# Patient Record
Sex: FEMALE | Race: AMERICAN INDIAN OR ALASKA NATIVE
[De-identification: names, ages, dates, MRNs, and addresses within clinical notes are randomized per-mention and may not be internally consistent; named-entity substitution may affect disease eponyms.]

---

## 2018-04-27 ENCOUNTER — HOSPITAL ENCOUNTER (INPATIENT)
Dept: HOSPITAL 5 - LD | Age: 38
LOS: 3 days | Discharge: HOME | End: 2018-04-30
Attending: OBSTETRICS & GYNECOLOGY | Admitting: OBSTETRICS & GYNECOLOGY
Payer: COMMERCIAL

## 2018-04-27 DIAGNOSIS — Z3A.40: ICD-10-CM

## 2018-04-27 DIAGNOSIS — D64.9: ICD-10-CM

## 2018-04-27 DIAGNOSIS — O41.03X0: ICD-10-CM

## 2018-04-27 LAB
HCT VFR BLD CALC: 32.1 % (ref 30.3–42.9)
HGB BLD-MCNC: 11.1 GM/DL (ref 10.1–14.3)
MCH RBC QN AUTO: 31 PG (ref 28–32)
MCHC RBC AUTO-ENTMCNC: 35 % (ref 30–34)
MCV RBC AUTO: 90 FL (ref 79–97)
PLATELET # BLD: 159 K/MM3 (ref 140–440)
RBC # BLD AUTO: 3.59 M/MM3 (ref 3.65–5.03)

## 2018-04-27 PROCEDURE — 36415 COLL VENOUS BLD VENIPUNCTURE: CPT

## 2018-04-27 PROCEDURE — 86592 SYPHILIS TEST NON-TREP QUAL: CPT

## 2018-04-27 PROCEDURE — 86850 RBC ANTIBODY SCREEN: CPT

## 2018-04-27 PROCEDURE — 86900 BLOOD TYPING SEROLOGIC ABO: CPT

## 2018-04-27 PROCEDURE — 85027 COMPLETE CBC AUTOMATED: CPT

## 2018-04-27 PROCEDURE — 99211 OFF/OP EST MAY X REQ PHY/QHP: CPT

## 2018-04-27 PROCEDURE — 85014 HEMATOCRIT: CPT

## 2018-04-27 PROCEDURE — G0463 HOSPITAL OUTPT CLINIC VISIT: HCPCS

## 2018-04-27 PROCEDURE — 85018 HEMOGLOBIN: CPT

## 2018-04-27 PROCEDURE — 88307 TISSUE EXAM BY PATHOLOGIST: CPT

## 2018-04-27 PROCEDURE — A6250 SKIN SEAL PROTECT MOISTURIZR: HCPCS

## 2018-04-27 PROCEDURE — 86901 BLOOD TYPING SEROLOGIC RH(D): CPT

## 2018-04-27 RX ADMIN — FENTANYL CITRATE PRN MCG: 50 INJECTION, SOLUTION INTRAMUSCULAR; INTRAVENOUS at 17:03

## 2018-04-27 RX ADMIN — FENTANYL CITRATE PRN MCG: 50 INJECTION, SOLUTION INTRAMUSCULAR; INTRAVENOUS at 15:00

## 2018-04-27 RX ADMIN — KETOROLAC TROMETHAMINE PRN MG: 30 INJECTION, SOLUTION INTRAMUSCULAR at 19:44

## 2018-04-27 NOTE — HISTORY AND PHYSICAL REPORT
History of Present Illness


Date of examination: 18


Date of admission: 


18 12:07





Chief complaint: 


decreased fm





History of present illness: 





EDC Calculations 


LMP: 2018











Past Pregnancy History 


   :      4


   Term Births:      2


   Premature Births:   0


   Living Children:   2


   Para:         2


   Aborta:      1


   Elect. Ab:      1





Pregnancy # 1


   Delivery date:     2000


   Weeks Gestation:   term


   Delivery type:     


   Anesthesia type:     none


   Delivery location:      [facility]


   Infant Sex:      Female


   Birth weight:      7-6


   Comments:      ptylism / HG





Pregnancy # 2


   Delivery date:     2004


   Weeks Gestation:   6


   Delivery type:     EAB


   Comments:      denies





Pregnancy # 3


   Delivery date:     2013


   Weeks Gestation:   term


   Delivery type:     


   Anesthesia type:     epidural


   Del[facility] location:     [facility]


   Infant Sex:      Female


   Birth weight:      7-6


   Comments:      ptylism / HG / Iron infusions








Risk Factors: 





Smoked Tobacco Use:  Never smoker


Smokeless Tobacco Use:  Never


Passive smoke exposure:  no


Drug use:  no


HIV high-risk behavior:  low risk


Caffeine use:  1 drinks per day


Alcohol use:  no


Seatbelt use:  preg- %





Dietary Counseling: pn yes








Past Medical History:


   Anemia Pt had iron infusion with last pregnancy





Past Surgical History:


   Appendectomy





Past Medical History 


Surgery (Non-gyn): Appendectomy





Abnormal PAP: negative


GARLAND Exposure: negative


Infertility: negative


Uterine Anomaly: negative


Uterine Surgery (not C/S): negative


Other Gynecologic Problems: negative





Infection History 


Hx of STD: none


HIV Risk Eval: low risk


Hepatitis B Risk Eval: low risk


Personal hx. of genital herpes: no


Partner hx. of genital herpes: no


Rash, Viral, or Febrile illness since last LMP? no


Varicella/Chicken Pox Status: Previous Disease


TB Risk: no





Genetic History 


ADVANCED MATERNAL AGE


Congenital Heart Defect:


    Mom: no  Dad: no


Canavan Disease:


    Mom: no  Dad: no


Thalassemia


    Mom: no  Dad: no


Neural Tube Defect


    Mom: no  Dad: no


Down's Syndrome


    Mom: no  Dad: no


Hector-Sachs


    Mom: no  Dad: no


Sickle Cell Disease/Trait


    Mom: no  Dad: no


Hemophilia


    Mom: no  Dad: no


Muscular Dystrophy


    Mom: no  Dad: no


Cystic Fibrosis


    Mom: no  Dad: no


[place] Chorea


    Mom: no  Dad: no


Mental Retardation


    Mom: no  Dad: no


Fragile X


    Mom: no  Dad: no


Other Genetic/Chromosomal Disorder


    Mom: no  Dad: no


Child w/other birth defect


    Mom: no  Dad: no





Enviromental Exposures 


Xray Exposure: no


Medication, drug, or alcohol use since LMP: no


Chemical/Other Exposure: no


Exposure to Cat Liter: no


Hx of Parvovirus (Fifth Disease): no


Occupational Exposure to Children: none


Active Medications:


PROMETHAZINE HCL 25 MG SUPP (PROMETHAZINE HCL) 1 per rectum q6hrs prn


ZOFRAN ODT 8 MG TBDP (ONDANSETRON) 1 po q12hrs prn





Current Allergies:


No known allergies





Past History


Past Medical History: other (see HPI)


Past Surgical History: other (See HPI)





- Obstetrical History


Expected Date of Delivery: 18


Actual Gestation: 40 Week(s) 4 Day(s) 


: 4


Para: 2


Hx # Term Pregnancies: 2


Number of  Pregnancies: 0


Spontaneous Abortions: 1


Induced : 0


Number of Living Children: 2





Review of Systems


All systems: negative





- Physical Exam


Breasts: Positive: normal


Cardiovascular: Regular rate


Lungs: Positive: Clear to auscultation, Normal air movement


Abdomen: Positive: normal appearance, soft


Genitourinary (Female): Positive: normal external genitalia, normal perenium


Vulva: both: normal


Vagina: Positive: normal moisture


Uterus: Positive: normal size, normal contour


Extremities: Positive: normal


Deep Tendon Reflex Grade: Normal +2





- Obstetrical


Cervical Dilatation: 4.5


Cervical Effacement Percentage: 60


Fetal station: -2


Uterine Tone Measurement Phase: Resting





Results


All other labs normal.








Assessment and Plan


 38y/o  admitted after arriving for routine office visit and reporting 

decreased FM. BPP in office 0/8, Neelima 3, vertex., efw 8#1oz. Patient admitted 

for delivery. GBS NEG, pregnancy uncomplicated except for anemia  and advanced 

maternal age. Admission orders in EMR. Dr. Walton aware of patient's status.








- Patient Problems


(1) Non-reassuring fetal status


Current Visit: Yes   Status: Acute   





(2) 40 weeks gestation of pregnancy


Current Visit: Yes   Status: Acute   





(3) Abnormal  test


Current Visit: Yes   Status: Acute   





(4) Anemia


Current Visit: Yes   Status: Acute

## 2018-04-27 NOTE — PROGRESS NOTE
Assessment and Plan


 AROM, clear fluid. ISE and IUPC placed without difficulty.  FHT CAT 1. Will 

start pitocin induction, anticipate . Dr. Walton aware. 








- Patient Problems


(1) Non-reassuring fetal status


Current Visit: Yes   Status: Acute   





(2) 40 weeks gestation of pregnancy


Current Visit: Yes   Status: Acute   





(3) Abnormal  test


Current Visit: Yes   Status: Acute   





(4) Anemia


Current Visit: Yes   Status: Acute   





Subjective





- Subjective


Date of service: 18


Principal diagnosis: IUP @ 40+4, BPP 0/8, oligohydramnios, 


Interval history: 





EDC Calculations 


LMP: 2018











Past Pregnancy History 


   :      4


   Term Births:      2


   Premature Births:   0


   Living Children:   2


   Para:         2


   Aborta:      1


   Elect. Ab:      1





Pregnancy # 1


   Delivery date:     


   Weeks Gestation:   term


   Delivery type:     


   Anesthesia type:     none


   Delivery location:     [facility]


   Infant Sex:      Female


   Birth weight:      7-6


   Comments:      ptylism / HG





Pregnancy # 2


   Delivery date:     


   Weeks Gestation:   6


   Delivery type:     EAB


   Comments:      denies





Pregnancy # 3


   Delivery date:     


   Weeks Gestation:   term


   Delivery type:     


   Anesthesia type:     epidural


   Del[facility] location:     [facility]


   Infant Sex:      Female


   Birth weight:      7-6


   Comments:      ptylism / HG / Iron infusions








Risk Factors: 





Smoked Tobacco Use:  Never smoker


Smokeless Tobacco Use:  Never


Passive smoke exposure:  no


Drug use:  no


HIV high-risk behavior:  low risk


Caffeine use:  1 drinks per day


Alcohol use:  no


Seatbelt use:  preg- %





Dietary Counseling: pn yes








Past Medical History:


   Anemia Pt had iron infusion with last pregnancy





Past Surgical History:


   Appendectomy





Past Medical History 


Surgery (Non-gyn): Appendectomy





Abnormal PAP: negative


GARLAND Exposure: negative


Infertility: negative


Uterine Anomaly: negative


Uterine Surgery (not C/S): negative


Other Gynecologic Problems: negative





Infection History 


Hx of STD: none


HIV Risk Eval: low risk


Hepatitis B Risk Eval: low risk


Personal hx. of genital herpes: no


Partner hx. of genital herpes: no


Rash, Viral, or Febrile illness since last LMP? no


Varicella/Chicken Pox Status: Previous Disease


TB Risk: no





Genetic History 


ADVANCED MATERNAL AGE


Congenital Heart Defect:


    Mom: no  Dad: no


Canavan Disease:


    Mom: no  Dad: no


Thalassemia


    Mom: no  Dad: no


Neural Tube Defect


    Mom: no  Dad: no


Down's Syndrome


    Mom: no  Dad: no


Hector-Sachs


    Mom: no  Dad: no


Sickle Cell Disease/Trait


    Mom: no  Dad: no


Hemophilia


    Mom: no  Dad: no


Muscular Dystrophy


    Mom: no  Dad: no


Cystic Fibrosis


    Mom: no  Dad: no


Romario Chorea


    Mom: no  Dad: no


Mental Retardation


    Mom: no  Dad: no


Fragile X


    Mom: no  Dad: no


Other Genetic/Chromosomal Disorder


    Mom: no  Dad: no


Child w/other birth defect


    Mom: no  Dad: no





Enviromental Exposures 


Xray Exposure: no


Medication, drug, or alcohol use since LMP: no


Chemical/Other Exposure: no


Exposure to Cat Liter: no


Hx of Parvovirus (Fifth Disease): no


Occupational Exposure to Children: none


Active Medications:


PROMETHAZINE HCL 25 MG SUPP (PROMETHAZINE HCL) 1 per rectum q6hrs prn


ZOFRAN ODT 8 MG TBDP (ONDANSETRON) 1 po q12hrs prn





Current Allergies:


No known allergies


Patient reports: no new complaints, no loss of fluid, no vaginal bleeding, no 

fetal movement normal (decreased)





Objective





- Exam


Breasts: normal


Cardiovascular: Regular rate


Lungs: Clear to auscultation, Normal air movement


Abdomen: Present: normal appearance, soft


Vulva: both: normal


Uterus: Present: normal


FHR: auscultation normal, category 1


Uterine Contraction Monitor Mode: External


Uterine Contraction Pattern: Irregular


Uterine Tone Measurement Phase: Contraction


Uterine Contraction Intensity: Mild


Extremities: normal


Deep Tendon Reflex Grade: Normal +2

## 2018-04-27 NOTE — OPERATIVE REPORT
Operative Report


Operative Report: 


Date of procedure: 2018





Pre-operative diagnosis: 40+ weeks gestation


                                 Nonreassuring  testing 


                                 Fetal intolerance to labor


                                 Failure to descend


                                 Fetal OP presentation





Post-operative diagnosis: Same





Procedure name(s): Stat Primary low transverse  section via 

Pfannenstiel skin incision





Surgeon: Dr. Walton 





Assistant: SMOOTH





Anesthesia: Gen. endotracheal anesthesia





EBL: 300 mL





Urine output: 300 mL mL of clear urine out at the end of the procedure





Fluids: 1 L





Findings: Liveborn male infant weight 7 lbs. 9 oz. Apgars of 8 and 9 at one and 

5 minutes.  


             Grossly normal fallopian tubes and ovaries bilaterally.  


              Normal uterus  





Indications: Patient seen in the office with BPP of 2 out of 8 and decreased 

fetal movement patient was examined and noted to be in labor.  Patient sent for 

augmentation of labor.  Patient progressed to 10 cm and was noted to have 

repetitive fetal decelerations.  With pushing there was no descent of fetal 

head.  Decision was made to proceed with stat  section at this time due 

to fetal decelerations and no progression of descent.





Procedure: Patient was taking to the operating room.  Patient was then prepped 

and draped in sterile fashion after anesthesia was found to be adequate.  A low 

transverse skin incision was made with the scalpel and carried down to the 

underlying layer of fascia with the scalpel. The fascia was then incised in the 

midline and this incision was extended bilaterally with the scalpel.    The 

rectus muscles were then bluntly divided in the midline.  The peritoneum was 

identified and entered into bluntly.  A lower transverse uterine incision was 

made with the scalpel and extended bilaterally with the dissection.   The infant

's head was then delivered atraumatically in OP presentation.  The anterior 

shoulder and rest of infant delivered without difficulty.  The umbilical cord 

was clamped x2.  The cord was cut.  The infant was then placed in sterile 

bassinet.  The placenta was manually extracted in its entirety.  The uterus was 

exteriorized and cleared of all clots and debris.  The uterine incision was 

closed using 0 Vicryl in a running locking fashion.  A second imbricating layer 

of the same suture was then created.  The posterior cul-de-sac was copiously 

irrigated.  The uterus was returned to the abdomen.  The gutters were also 

irrigated.  The anterior rectus muscles were reapproximated using 3-0 Vicryl.  

The anterior rectus fascia was reapproximated using 0 Vicryl in a running 

fashion.  The subcuticular fat was reapproximated using 2-0 Vicryl in a running 

fashion.  The skin was reapproximated with 4-0 Monocryl in a subcuticular 

stitch.  The patient tolerated the procedure well.  Sponge lap and needle 

counts were all correct x3.  Patient was taken to the recovery room awake and 

in stable condition.

## 2018-04-27 NOTE — PROGRESS NOTE
Assessment and Plan


 Patient c/o urge to push with ctx, SVE anterior lip/0 station. Attempted to 

reduce the lip so patient could push. Baby's presentation direct OP.   After a 

few attempts at pushing, fht did not return to baseline. Position changes to 

knee chest, then to right and left lateral. Dr. Walton called to come to 

bedside. Patient attempted pushing with Dr. Walton without successful decent. 

Decision made for c/s. Orders in EMR and consents obtained. 








- Patient Problems


(1) Non-reassuring fetal status


Current Visit: Yes   Status: Acute   





(2) 40 weeks gestation of pregnancy


Current Visit: Yes   Status: Acute   





(3) Failure of fetal descent in labor, delivered, current hospitalization


Current Visit: Yes   Status: Acute   





Subjective





- Subjective


Date of service: 18


Principal diagnosis: IUP @ 40+4, BPP 0/8, oligohydramnios, 


Interval history: 





EDC Calculations 


LMP: 2018











Past Pregnancy History 


   :      4


   Term Births:      2


   Premature Births:   0


   Living Children:   2


   Para:         2


   Aborta:      1


   Elect. Ab:      1





Pregnancy # 1


   Delivery date:     


   Weeks Gestation:   term


   Delivery type:     


   Anesthesia type:     none


   Delivery location:     [facility]


   Infant Sex:      Female


   Birth weight:      7-6


   Comments:      ptylism / HG





Pregnancy # 2


   Delivery date:     


   Weeks Gestation:   6


   Delivery type:     EAB


   Comments:      denies





Pregnancy # 3


   Delivery date:     2013


   Weeks Gestation:   term


   Delivery type:     


   Anesthesia type:     epidural


   Del[facility] location:     [facility]


   Infant Sex:      Female


   Birth weight:      7-6


   Comments:      ptylism / HG / Iron infusions








Risk Factors: 





Smoked Tobacco Use:  Never smoker


Smokeless Tobacco Use:  Never


Passive smoke exposure:  no


Drug use:  no


HIV high-risk behavior:  low risk


Caffeine use:  1 drinks per day


Alcohol use:  no


Seatbelt use:  preg- %





Dietary Counseling: pn yes








Past Medical History:


   Anemia Pt had iron infusion with last pregnancy





Past Surgical History:


   Appendectomy





Past Medical History 


Surgery (Non-gyn): Appendectomy





Abnormal PAP: negative


GARLAND Exposure: negative


Infertility: negative


Uterine Anomaly: negative


Uterine Surgery (not C/S): negative


Other Gynecologic Problems: negative





Infection History 


Hx of STD: none


HIV Risk Eval: low risk


Hepatitis B Risk Eval: low risk


Personal hx. of genital herpes: no


Partner hx. of genital herpes: no


Rash, Viral, or Febrile illness since last LMP? no


Varicella/Chicken Pox Status: Previous Disease


TB Risk: no





Genetic History 


ADVANCED MATERNAL AGE


Congenital Heart Defect:


    Mom: no  Dad: no


Canavan Disease:


    Mom: no  Dad: no


Thalassemia


    Mom: no  Dad: no


Neural Tube Defect


    Mom: no  Dad: no


Down's Syndrome


    Mom: no  Dad: no


Hector-Sachs


    Mom: no  Dad: no


Sickle Cell Disease/Trait


    Mom: no  Dad: no


Hemophilia


    Mom: no  Dad: no


Muscular Dystrophy


    Mom: no  Dad: no


Cystic Fibrosis


    Mom: no  Dad: no


[place]una Chorea


    Mom: no  Dad: no


Mental Retardation


    Mom: no  Dad: no


Fragile X


    Mom: no  Dad: no


Other Genetic/Chromosomal Disorder


    Mom: no  Dad: no


Child w/other birth defect


    Mom: no  Dad: no





Enviromental Exposures 


Xray Exposure: no


Medication, drug, or alcohol use since LMP: no


Chemical/Other Exposure: no


Exposure to Cat Liter: no


Hx of Parvovirus (Fifth Disease): no


Occupational Exposure to Children: none


Active Medications:


PROMETHAZINE HCL 25 MG SUPP (PROMETHAZINE HCL) 1 per rectum q6hrs prn


ZOFRAN ODT 8 MG TBDP (ONDANSETRON) 1 po q12hrs prn





Current Allergies:


No known allergies


Patient reports: new complaints, loss of fluid, vaginal bleeding, contractions





Objective





- Vital Signs


Vital Signs: 


 Vital Signs - 12hr











  18





  13:00


 


Temperature 97.1 F L


 


Respiratory 18





Rate 














- Exam


Cardiovascular: Regular rate


Lungs: Clear to auscultation


Abdomen: Present: normal appearance, soft


Vulva: both: normal


Uterus: Present: normal


FHR: category 2


Uterine Contraction Monitor Mode: Internal


Cervical Dilatation: 9.5


Cervical Effacement Percentage: 100


Fetal station: 0


Uterine Contraction Frequency (min): 2-3


Uterine Contraction Duration: 60


Uterine Contraction Pattern: Regular


Uterine Tone Measurement Phase: Contraction


Uterine Contraction Intensity: Strong/Firm


Extremities: normal





- Labs


Labs: 


 Abnormal Labs











  18





  12:30


 


RBC  3.59 L


 


MCHC  35 H


 


RDW  12.8 L








 Laboratory Results - last 24 hr











  18





  12:30 12:30


 


WBC  6.2 


 


RBC  3.59 L 


 


Hgb  11.1 


 


Hct  32.1 


 


MCV  90 


 


MCH  31 


 


MCHC  35 H 


 


RDW  12.8 L 


 


Plt Count  159 


 


Blood Type   B POSITIVE


 


Antibody Screen   Negative

## 2018-04-28 LAB
HCT VFR BLD CALC: 24.3 % (ref 30.3–42.9)
HGB BLD-MCNC: 8.6 GM/DL (ref 10.1–14.3)

## 2018-04-28 RX ADMIN — IBUPROFEN PRN MG: 800 TABLET, FILM COATED ORAL at 23:47

## 2018-04-28 RX ADMIN — CEFAZOLIN SCH MLS/MIN: 10 INJECTION, POWDER, FOR SOLUTION INTRAVENOUS at 02:35

## 2018-04-28 RX ADMIN — FERROUS SULFATE TAB 325 MG (65 MG ELEMENTAL FE) SCH MG: 325 (65 FE) TAB at 12:59

## 2018-04-28 RX ADMIN — KETOROLAC TROMETHAMINE PRN MG: 30 INJECTION, SOLUTION INTRAMUSCULAR at 10:59

## 2018-04-28 RX ADMIN — CEFAZOLIN SCH MLS/MIN: 10 INJECTION, POWDER, FOR SOLUTION INTRAVENOUS at 10:54

## 2018-04-28 RX ADMIN — DOCUSATE SODIUM SCH MG: 50 LIQUID ORAL at 22:46

## 2018-04-28 RX ADMIN — DOCUSATE SODIUM SCH: 50 LIQUID ORAL at 18:58

## 2018-04-28 RX ADMIN — HYDROCODONE BITARTRATE AND ACETAMINOPHEN PRN EACH: 5; 325 TABLET ORAL at 12:59

## 2018-04-28 RX ADMIN — FERROUS SULFATE TAB 325 MG (65 MG ELEMENTAL FE) SCH MG: 325 (65 FE) TAB at 22:46

## 2018-04-28 RX ADMIN — IBUPROFEN PRN MG: 800 TABLET, FILM COATED ORAL at 18:35

## 2018-04-28 NOTE — PROGRESS NOTE
<MUNIR BALDERRAMA - Last Filed: 18 08:32>





Assessment and Plan


Pt is in very good spirits No c/o voiced Feeding baby during my visit. VSS ff 

below umb Lochia small Incision D&I H&H pending Doing well s/p c/s P: continue 

pathway Advance diet and activity as tolerated.


Reassured pt she did a great job she was question her pushing effort. Healthy 

baby Healthy mom








Subjective





- Subjective


Date of service: 18 (pt sitting up in bed breast feeding No c/o voiced)


Principal diagnosis: 12hr s/p primary  section


Patient reports: pain well controlled, other (bonilla has just been removed will 

monitor output)


[place]: doing well, nursing well





Objective





- Vital Signs


Latest vital signs: 


 Vital Signs











  Temp Pulse Resp BP BP Pulse Ox


 


 18 01:56  98.9 F  74  16   118/64 


 


 18 21:00  98.2 F  76  18   117/61 


 


 18 20:30  98.8 F  85  13  124/71   98


 


 18 20:25   78  12  114/65   97


 


 18 20:10  98.3 F  78  16  123/73   98


 


 18 19:56   105 H  15  127/88   99


 


 18 19:44    15   


 


 18 19:41   109 H  15  116/61   99


 


 18 19:26   101 H  15  126/71   99


 


 18 19:21   102 H  15  133/76   99


 


 18 19:15   97 H  15  127/75   99


 


 18 13:00  97.1 F L   18   








 Intake and Output











 18





 22:59 06:59 14:59


 


Intake Total 1003.6 100 


 


Output Total 400  


 


Balance 603.6 100 


 


Intake:   


 


  IV 1003.6  


 


    PITOCin/NS 30 UNIT/500ML 3.6  





    30 units In 500 ml @ 4   





    mls/hr IV TITR [facility] Rx#:   





    [id number]   


 


  Intake, Free Water  100 


 


Output:   


 


  Urine 400  


 


    Uretheral (Bonilla) 300  


 


Other:   


 


  Estimated Blood Loss 300  














- Exam


Breasts: Present: normal, breastfeeding


Cardiovascular: Present: Regular rate


Lungs: Present: Clear to auscultation, Normal air movement


Abdomen: Present: normal appearance, soft, normal bowel sounds


Uterus: Present: normal, firm, fundal height below umbilicus


Extremities: Present: normal


Deep Tendon Reflex Grade: Normal +2


Incision: Present: normal, dry, intact





- Labs


Labs: 


 Abnormal lab results











  18 Range/Units





  12:30 


 


RBC  3.59 L  (3.65-5.03)  M/mm3


 


MCHC  35 H  (30-34)  %


 


RDW  12.8 L  (13.2-15.2)  %














<FRANCOISE ISAAC - Last Filed: 18 13:01>





Assessment and Plan





- Patient Problems


(1)  delivery delivered


Current Visit: Yes   Status: Acute   


Plan to address problem: 


Called to room to evaluated bleeding at right side of her incision, steristrips 

removed, slight bleeding noted, hemostasis was obtained with manual pressure 

and re-enforcing the dressing and placing an abdominal binder. Will leave 

dressing in place today and evaluate area tomorrow, o/w continue post C/S 

pathway. patient and  agree with plan of care. 








Objective





- Vital Signs


Latest vital signs: 


 Vital Signs











  Temp Pulse Resp BP BP Pulse Ox


 


 18 10:59    18   


 


 18 09:00  98.2 F  87  14  108/71   98


 


 18 08:25  98.2 F  79  18   101/61 


 


 18 08:23  98.2 F  91 H  20  101/61   97


 


 18 06:35  98.9 F  84  18   105/62 


 


 18 01:56  98.9 F  74  16   118/64 


 


 18 21:00  98.2 F  76  18   117/61 


 


 18 20:30  98.8 F  85  13  124/71   98


 


 18 20:25   78  12  114/65   97


 


 18 20:10  98.3 F  78  16  123/73   98


 


 18 19:56   105 H  15  127/88   99


 


 18 19:44    15   


 


 18 19:41   109 H  15  116/61   99


 


 18 19:26   101 H  15  126/71   99


 


 18 19:21   102 H  15  133/76   99


 


 18 19:15   97 H  15  127/75   99


 


 18 13:00  97.1 F L   18   








 Intake and Output











 18





 22:59 06:59 14:59


 


Intake Total 1003.6 100 360


 


Output Total 400  


 


Balance 603.6 100 360


 


Intake:   


 


  IV 1003.6  


 


    PITOCin/NS 30 UNIT/500ML 3.6  





    30 units In 500 ml @ 4   





    mls/hr IV TITR [facility] Rx#:   





    [id number]   


 


  Oral   360


 


  Intake, Free Water  100 


 


Output:   


 


  Urine 400  


 


    Uretheral (Bonilla) 300  


 


Other:   


 


  Total, Intake Amount   360


 


  Estimated Blood Loss 300  














- Labs


Labs: 


 Abnormal lab results











  18 Range/Units





  12:30 07:56 


 


RBC  3.59 L   (3.65-5.03)  M/mm3


 


Hgb   8.6 L  (10.1-14.3)  gm/dl


 


Hct   24.3 L D  (30.3-42.9)  %


 


MCHC  35 H   (30-34)  %


 


RDW  12.8 L   (13.2-15.2)  %

## 2018-04-29 LAB
HCT VFR BLD CALC: 22.6 % (ref 30.3–42.9)
HCT VFR BLD CALC: 23.8 % (ref 30.3–42.9)
HGB BLD-MCNC: 7.9 GM/DL (ref 10.1–14.3)
HGB BLD-MCNC: 8.2 GM/DL (ref 10.1–14.3)

## 2018-04-29 RX ADMIN — IBUPROFEN PRN MG: 800 TABLET, FILM COATED ORAL at 11:38

## 2018-04-29 RX ADMIN — FERROUS SULFATE TAB 325 MG (65 MG ELEMENTAL FE) SCH MG: 325 (65 FE) TAB at 11:38

## 2018-04-29 RX ADMIN — FERROUS SULFATE TAB 325 MG (65 MG ELEMENTAL FE) SCH MG: 325 (65 FE) TAB at 22:10

## 2018-04-29 RX ADMIN — IBUPROFEN PRN MG: 800 TABLET, FILM COATED ORAL at 06:36

## 2018-04-29 RX ADMIN — HYDROCODONE BITARTRATE AND ACETAMINOPHEN PRN EACH: 5; 325 TABLET ORAL at 04:32

## 2018-04-29 RX ADMIN — HYDROCODONE BITARTRATE AND ACETAMINOPHEN PRN EACH: 5; 325 TABLET ORAL at 18:00

## 2018-04-29 RX ADMIN — DOCUSATE SODIUM SCH MG: 50 LIQUID ORAL at 11:37

## 2018-04-29 RX ADMIN — DOCUSATE SODIUM SCH MG: 50 LIQUID ORAL at 22:10

## 2018-04-29 NOTE — EVENT NOTE
Date: 04/29/18 (slight drop noted in H&H)


Pt sitting in bed Has had her shower w/o any SOB, denies dizziness, no c/o 

chest pain. Pulse 90, O2sat 100. Consulted with Dr.Royster chirinos H&H @ 1800. 

Ordered.


Pt encouraged to hydrate, use IS, alert staff if there are any s/sx with 

ambulating.

## 2018-04-29 NOTE — PROGRESS NOTE
Assessment and Plan


Pt resting quietly in bed with NB. C/O pain after "doing too much", was just 

given po meds. VSS FF below umb Lochia scant Abdomen is soft +BS Pressure 

dressing is D&I H&H  drop r/t blood loss from surgery. Pt states she is SOB 

@ times when OOB but not faint. Discussed poss need for blood Pt declines at 

this time. Repeat H&H ordered. Anemia s/p  section otherwise doing 

well. P: continue pathway, H&H ordered, hydration, IS, ambulate with 

assistance. Will continue to monitor. Will consult with 








Subjective





- Subjective


Date of service: 18 (pt request to stay another day)


Principal diagnosis: Day # 2  s/p primary  section


Patient reports: appetite normal, voiding normally, pain well controlled, other 

(pt states she gets some SOB when she gets up; VSS; discussed transfusion, pt 

declines)


: doing well





Objective





- Vital Signs


Latest vital signs: 


 Vital Signs











  Temp Pulse Resp BP Pulse Ox


 


 18 06:36    18  


 


 18 05:32    16  


 


 18 04:32    20  


 


 18 01:22  98.7 F  80  18  113/65 


 


 18 00:47    17  


 


 18 23:47    18  


 


 18 16:34  98.5 F  73  18  104/70  97


 


 18 12:59    18  


 


 18 12:45    20  


 


 18 12:00  98.3 F  71  18  120/69 


 


 18 10:59    18  


 


 18 10:45    18  








 Intake and Output











 18





 22:59 06:59 14:59


 


Intake Total 480 600 


 


Output Total 200  


 


Balance 280 600 


 


Intake:   


 


  Oral 240  


 


  Intake, Free Water 240 600 


 


Output:   


 


  Urine 200  


 


    Void 200  


 


Other:   


 


  Total, Intake Amount 120  


 


  Total, Output Amount 200  


 


  # Voids   


 


    Void  2 














- Exam


Breasts: Present: normal


Cardiovascular: Present: Regular rate


Lungs: Present: Clear to auscultation, Normal air movement


Abdomen: Present: normal appearance, soft, normal bowel sounds


Vulva: both: normal (swelling seems to be less today will continue ice packs)


Uterus: Present: normal, firm, fundal height below umbilicus


Extremities: Present: normal


Deep Tendon Reflex Grade: Normal +2


Incision: Present: normal, dry, intact, dressed (pressure dressing that was 

placed yesterday is dry and intact. Will remove @ 24hr  and assess site.)

## 2018-04-30 VITALS — SYSTOLIC BLOOD PRESSURE: 114 MMHG | DIASTOLIC BLOOD PRESSURE: 71 MMHG

## 2018-04-30 RX ADMIN — HYDROCODONE BITARTRATE AND ACETAMINOPHEN PRN EACH: 5; 325 TABLET ORAL at 02:04

## 2018-04-30 RX ADMIN — FERROUS SULFATE TAB 325 MG (65 MG ELEMENTAL FE) SCH MG: 325 (65 FE) TAB at 10:21

## 2018-04-30 RX ADMIN — IBUPROFEN PRN MG: 800 TABLET, FILM COATED ORAL at 08:10

## 2018-04-30 NOTE — DISCHARGE SUMMARY
Providers





- Providers


Date of Admission: 


18 12:07





Date of discharge: 18


Attending physician: 


IRINEO CHRISTOPHER





Primary care physician: 


IRINEO CHRISTOPHER








Hospitalization


Reason for admission: active labor


Delivery: 


Procedure:  section


Procedure details: 





see op note


Incision: normal, dry, intact ( steristrips C/D/I. )


Postpartum complications: none


 baby: male


Hospital course: 





Pt s/p c/s. Routine pp post op care. She did have some drainage from one end of 

the incision that has since stopped. There is no drainage at this time and 

dressing is c/d/i. 


Condition at discharge: Good


Disposition: DC-01 TO HOME OR SELFCARE





- Discharge Diagnoses


(1)  delivery delivered


Status: Acute   





(2) Anemia


Status: Acute   


Qualifiers: 


   Anemia type: other cause 





Plan





- Discharge Medications


Prescriptions: 


Ibuprofen 800 mg PO Q6HR #30 tablet


Lidocain2.5%/Prilocai2.5% [Emla] 0 gm TP ONCE #1 tube


oxyCODONE /ACETAMINOPHEN [Percocet 5/325] 1 tab PO Q4HR #30 tab





- Provider Discharge Summary


Activity: routine, no sex for 6 weeks, no heavy lifting 4 weeks, no strenuous 

exercise


Diet: routine


Instructions: routine


Additional instructions: 


[]  Smoking cessation referral if applicable(refer to patient education folder 

for contact #)


[]  Refer to [facility] Booklet








Call your doctor immediately for:


* Fever > 100.5


* Heavy vaginal bleeding ( >1 pad per hour)


* Severe persistent headache


* Shortness of breath


* Reddened, hot, painful area to leg or breast


* Drainage or odor from incision.





* Keep incision clean and dry at all times and follow doctor's instructions 

regarding bathing/showering











- Follow up plan


Follow up: 


IRINEO CHRISTOPHER MD [Primary Care Provider] - 7 Days


Forms:  [facility] Discharge Summary, Discharge Signature Page